# Patient Record
Sex: MALE | Race: WHITE | URBAN - METROPOLITAN AREA
[De-identification: names, ages, dates, MRNs, and addresses within clinical notes are randomized per-mention and may not be internally consistent; named-entity substitution may affect disease eponyms.]

---

## 2021-02-15 ENCOUNTER — EMERGENCY (EMERGENCY)
Facility: HOSPITAL | Age: 51
LOS: 0 days | Discharge: HOME | End: 2021-02-15
Attending: EMERGENCY MEDICINE
Payer: COMMERCIAL

## 2021-02-15 VITALS
HEART RATE: 109 BPM | TEMPERATURE: 100 F | OXYGEN SATURATION: 95 % | SYSTOLIC BLOOD PRESSURE: 138 MMHG | RESPIRATION RATE: 18 BRPM | DIASTOLIC BLOOD PRESSURE: 100 MMHG

## 2021-02-15 VITALS — HEIGHT: 72 IN | WEIGHT: 255.07 LBS

## 2021-02-15 DIAGNOSIS — S61.121A: ICD-10-CM
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
## 2021-02-15 DIAGNOSIS — Z23 ENCOUNTER FOR IMMUNIZATION: ICD-10-CM
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
## 2021-02-15 PROCEDURE — 73140 X-RAY EXAM OF FINGER(S): CPT | Mod: 26,RT

## 2021-02-15 PROCEDURE — 90471 IMMUNIZATION ADMIN: CPT

## 2021-02-15 PROCEDURE — 26765 TREAT FINGER FRACTURE EACH: CPT | Mod: F5

## 2021-02-15 PROCEDURE — 90715 TDAP VACCINE 7 YRS/> IM: CPT

## 2021-02-15 PROCEDURE — 11012 DEB SKIN BONE AT FX SITE: CPT | Mod: F5

## 2021-02-15 PROCEDURE — 99285 EMERGENCY DEPT VISIT HI MDM: CPT | Mod: 25

## 2021-02-15 PROCEDURE — 99283 EMERGENCY DEPT VISIT LOW MDM: CPT

## 2021-02-15 PROCEDURE — 73140 X-RAY EXAM OF FINGER(S): CPT | Mod: RT

## 2021-02-15 PROCEDURE — 14040 TIS TRNFR F/C/C/M/N/A/G/H/F: CPT | Mod: F5

## 2021-02-15 RX ORDER — TETANUS TOXOID, REDUCED DIPHTHERIA TOXOID AND ACELLULAR PERTUSSIS VACCINE, ADSORBED 5; 2.5; 8; 8; 2.5 [IU]/.5ML; [IU]/.5ML; UG/.5ML; UG/.5ML; UG/.5ML
0.5 SUSPENSION INTRAMUSCULAR ONCE
Refills: 0 | Status: COMPLETED | OUTPATIENT
Start: 2021-02-15 | End: 2021-02-15

## 2021-02-15 RX ORDER — CEPHALEXIN 500 MG
1 CAPSULE ORAL
Qty: 14 | Refills: 0
Start: 2021-02-15

## 2021-02-15 RX ORDER — CEPHALEXIN 500 MG
500 CAPSULE ORAL EVERY 12 HOURS
Refills: 0 | Status: DISCONTINUED | OUTPATIENT
Start: 2021-02-15 | End: 2021-02-15

## 2021-02-15 RX ADMIN — TETANUS TOXOID, REDUCED DIPHTHERIA TOXOID AND ACELLULAR PERTUSSIS VACCINE, ADSORBED 0.5 MILLILITER(S): 5; 2.5; 8; 8; 2.5 SUSPENSION INTRAMUSCULAR at 19:03

## 2021-02-15 RX ADMIN — Medication 500 MILLIGRAM(S): at 19:03

## 2021-02-15 NOTE — ED ADULT TRIAGE NOTE - CHIEF COMPLAINT QUOTE
pt a/ox3, reports laceration to right thumb, s/p cutting thumb with a saw. pt applied dressing to site at home. bleeding controlled at this time. pt last tetanus was in 2010.

## 2021-02-15 NOTE — ED STATDOCS - PROGRESS NOTE DETAILS
marcos wet read: meli avalos PA: Right thumb xrays +paco for tuft fracture. Paged Dr. Mcdowell.  ~Oni Smith PA-C PA: Spoke with Dr. Mcdowell, will see patient in ED shortly. ~Oni Smith PA-C Att: marcos wet read: meli avalos PA: Patient is a 52 y/o male with no pertinent PMHx presents to the ED c/o laceration to right thumb. PT states he accidentally cut right thumb with a saw to cut a piece of wood, states saw slipped d/t rain. Pt states he applied dressing to site of laceration PTA. Pt states last Tetanus was in 2010. Denies head strike LOC, blood thinner use. Denies any other injuries or complaints at this time. PA note: LAC repaired by Dr. Mcdowell. Xrays reviewed with with patient. Patient re-examined and re-evaluated. Patient feels much better at this time. ED evaluation, Diagnosis and management discussed with the patient in detail. Workup results discussed with ED attending, OK to dc home on Keflex. Close ORTHO Dr. Mcdowell follow up encouraged.  Strict ED return instructions discussed in detail and patient given the opportunity to ask any questions about their discharge diagnosis and instructions. Patient verbalized understanding. ~ Oni Smith PA-C

## 2021-02-15 NOTE — ED STATDOCS - CARE PLAN
Principal Discharge DX:	Fracture of distal phalanx of right thumb  Secondary Diagnosis:	Skin avulsion

## 2021-02-15 NOTE — ED STATDOCS - PATIENT PORTAL LINK FT
You can access the FollowMyHealth Patient Portal offered by [facility] by registering at the following website: [email or web address]. By joining [facility]’s FollowMyHealth portal, you will also be able to view your health information using other applications (apps) compatible with our system.

## 2021-02-15 NOTE — ED STATDOCS - PHYSICAL EXAMINATION
Constitutional: NAD AAOx3  Eyes: PERRLA EOMI  Head: Normocephalic atraumatic  Mouth: MMM  Cardiac: regular rate   Resp: Lungs CTAB  GI: Abd s/nt/nd, no rebound or guarding.  Neuro: awake, alert, moving all extremities, cranial nerves 2-12 intact, sensation intact, no dysmetria.  Skin: No rashes +right thumb with obvious laceration through distal tip of nail bed, neurovascularly intact, cap refill < 2 seconds. Subungual hematoma. Constitutional: NAD AAOx3  Eyes: PERRLA EOMI  Head: Normocephalic atraumatic  Mouth: MMM  Cardiac: regular rate   Resp: Lungs CTAB  GI: Abd s/nt/nd, no rebound or guarding.  Neuro: awake, alert, moving all extremities, cranial nerves 2-12 intact, sensation intact, no dysmetria.  Skin: No rashes +right thumb with obvious laceration through distal tip of nail bed, neurovascularly intact, cap refill < 2 seconds. Subungual hematoma.    PA NOTE: GEN: AOX3, NAD. HEENT: Throat clear. Airway is patent. EYES: PERRLA. EOMI. Head: NC/AT. NECK: Supple, No JVD. FROM. C-spine non-tender. CV:S1S2, RRR, LUNGS: Non-labored breathing, no tachypnea. O2sat 100% RA. CTA b/l. No w/r/r. CHEST: Equal chest expansion and rise. No deformity. ABD: Soft, NT/ND, no rebound, no guarding. No CVAT. EXT: No e/c/c. 2+ distal pulses. RIGHT THUMB: +Deep skin avulsion noted tip of distal phalanx of right 1st digit with partial amputation of thumbnail. +Mild bleeding. NVI. +Tenderness. No tendon deficits. NEURO: No focal deficits. CN II-XII intact. FROM. 5/5 motor and sensory. ~Oni Smith PA-C Constitutional: NAD AAOx3  Eyes: PERRLA EOMI  Head: Normocephalic atraumatic  Mouth: MMM  Cardiac: regular rate   Resp: Lungs CTAB  GI: Abd s/nt/nd, no rebound or guarding.  Neuro: awake, alert, moving all extremities, cranial nerves 2-12 intact, sensation intact, no dysmetria.  Skin: No rashes +right thumb with obvious laceration through distal tip of nail bed, neurovascularly intact, cap refill < 2 seconds. Subungual hematoma.    PA NOTE: GEN: AOX3, NAD. HEENT: Throat clear. Airway is patent. EYES: PERRLA. EOMI. Head: NC/AT. NECK: Supple, No JVD. FROM. C-spine non-tender. CV:S1S2, RRR, LUNGS: Non-labored breathing, no tachypnea. O2sat 100% RA. CTA b/l. No w/r/r. CHEST: Equal chest expansion and rise. No deformity. ABD: Soft, NT/ND, no rebound, no guarding. No CVAT. EXT: No e/c/c. 2+ distal pulses. RIGHT THUMB: +Deep skin avulsion noted tip of distal phalanx of right 1st digit with partial avulsion of thumbnail. +Mild bleeding. NVI. +Tenderness. No tendon deficits. NEURO: No focal deficits. CN II-XII intact. FROM. 5/5 motor and sensory. ~Oni Smith PA-C

## 2021-02-15 NOTE — ED STATDOCS - ATTENDING CONTRIBUTION TO CARE
I, Renetta Ricks MD, performed the initial face to face bedside interview with this patient regarding history of present illness, review of symptoms and relevant past medical, social and family history.  I completed an independent physical examination.  I was the initial provider who evaluated this patient. I have signed out the follow up of any pending tests (i.e. labs, radiological studies) to the ACP.  I have communicated the patient’s plan of care and disposition with the ACP.  The history, relevant review of systems, past medical and surgical history, medical decision making, and physical examination was documented by the scribe in my presence and I attest to the accuracy of the documentation.

## 2021-02-15 NOTE — ED STATDOCS - NSFOLLOWUPINSTRUCTIONS_ED_ALL_ED_FT
THUMB FRACTURE - General Information           Thumb Fracture    WHAT YOU NEED TO KNOW:    What is a thumb fracture? A thumb fracture is a break in a bone in your thumb.    What are the signs and symptoms of a thumb fracture?   •Pain and swelling      •Little or no thumb movement      •Thumb shape or position that is not normal      How is a thumb fracture diagnosed? Your healthcare provider will ask about the injury and examine your thumb. You may need any of the following:   •An x-ray may be used to check for a fracture.      •A CT scan may show ligament or tissue damage. You may be given contrast liquid before the pictures are taken to help healthcare providers see the damage better. Tell the healthcare provider if you have ever had an allergic reaction to contrast liquid.      How is a thumb fracture treated?   •Prescription pain medicine may be given. Ask your healthcare provider how to take this medicine safely. Some prescription pain medicines contain acetaminophen. Do not take other medicines that contain acetaminophen without talking to your healthcare provider. Too much acetaminophen may cause liver damage. Prescription pain medicine may cause constipation. Ask your healthcare provider how to prevent or treat constipation.       •A cast or splint will help keep your thumb in the correct position as it heals.      •Closed reduction is used to move the bones of your thumb back into their correct positions without surgery. Your healthcare provider will line your bones up by hand.      •Open reduction and internal fixation is surgery to straighten your broken bones. Wires, screws, metal plates, or pins may be used to hold your broken bones together.      How do I manage my symptoms?   •Apply ice on your thumb to help decrease swelling and pain. Ice may also help prevent tissue damage. Use an ice pack, or put crushed ice in a plastic bag. Cover it with a towel before you place it on your thumb, splint, or cast. Apply ice for 15 to 20 minutes every hour, or as directed.      •Elevate your thumb above the level of your heart as often as you can. This will help decrease swelling and pain. Prop your hand on pillows or blankets to keep your thumb elevated comfortably.             •Check the skin around your cast or splint daily for red or sore areas.      •Go to a hand therapist, if recommended. Your healthcare provider may recommend this after your cast or splint is removed. A hand therapist can help you with exercises to strengthen your hand and help restore movement.      When should I seek immediate care?   •Your cast cracks or breaks.      •You are not able to move your fingers.      •You have severe pain in your thumb or hand.      •You have new or increased swelling in your thumb or hand.      •Your cast feels too tight.      •Your injured thumb is numb.      •Your skin under the cast or splint burns or stings.      When should I call my doctor?   •The skin around your cast becomes red and sore.      •The skin under your cast or splint itches, and the itch does not go away.      •You have questions or concerns about your condition or care.      CARE AGREEMENT:    You have the right to help plan your care. Learn about your health condition and how it may be treated. Discuss treatment options with your healthcare providers to decide what care you want to receive. You always have the right to refuse treatment.    Deep Skin Avulsion      A deep skin avulsion is a type of open wound. It often results from a severe injury (trauma) that tears away all layers of the skin or an entire body part. The areas of the body that are most often affected include the face, lips, ears, nose, and fingers.    A deep skin avulsion may make structures below the skin become visible. You may be able to see muscle, bone, nerves, and blood vessels. A deep skin avulsion can also damage important structures beneath the skin. These include bones, tendons, nerves, or blood vessels.      What are the causes?  This condition may be caused by an injury, such as:  •Being crushed.      •Falling against a jagged surface.      •An animal bite.      •A gunshot wound.      •A severe burn.      •Being dragged, such as in a bicycle or motorcycle accident.        What are the signs or symptoms?  Symptoms of this condition include:  •Pain.      •Numbness.      •Swelling.      •Bleeding, which may be heavy.        How is this diagnosed?    This condition may be diagnosed with a medical history and physical exam. You may also have X-rays done.      How is this treated?  Treatment for this condition depends on how large and deep the wound is and where it is located. Treatment usually starts with:  •Controlling the bleeding.      •Washing out the wound with a germ-free (sterile) solution.    After initial treatment, the wound may be closed or left open to heal.  •Wounds that are small and clean may be closed with stitches (sutures).      •Wounds that cannot be closed with sutures may be covered with a piece of skin (graft) or your own skin flap.      •Wounds that are hard to close or that may become infected may be left open. These wounds heal over time from the inside out.    You may also be treated with medicine, such as:  •Antibiotic medicine.      •Pain medicine.      •Tetanus shot.        Follow these instructions at home:    Medicines     •If you were prescribed an antibiotic medicine, take or apply it as told by your health care provider. Do not stop taking or using the antibiotic even if your condition improves.      •Take over-the-counter and prescription medicines only as told by your health care provider.      •If you were prescribed a pain medicine, take it 30 minutes or more before you do any wound care, or as told by your health care provider.      • Do not drive or use heavy machinery while taking prescription pain medicine.        Wound care    •Follow instructions from your health care provider about how to take care of your wound. Make sure you:   •Wash your hands with soap and water before you change your bandage (dressing). If soap and water are not available, use hand .       •Change your dressing as told by your health care provider.       •Leave stitches (sutures), skin glue, or adhesive strips in place. These skin closures may need to stay in place for 2 weeks or longer. If adhesive strip edges start to loosen and curl up, you may trim the loose edges. Do not remove adhesive strips completely unless your health care provider tells you to do that.       •Check your wound every day for signs of infection. Check for:  •Redness, swelling, or pain.       •Fluid or blood.       •Warmth.       •Pus or a bad smell.        •Keep your dressing clean and dry. Do not take baths, swim, use a hot tub, or do anything that would put your wound under water until your health care provider approves.    •Clean the wound each day, or as told by your health care provider:  •Wash the wound with mild soap and water.      •Rinse the wound with water to remove all soap.      •Pat the wound dry with a clean towel. Do not rub it.      •Cover the wound with a clean dressing.        • Do not scratch or pick at the wound.      General instructions     •Raise (elevate) the injured area above the level of your heart while you are sitting or lying down.      • Do not use any products that contain nicotine or tobacco, such as cigarettes and e-cigarettes. These may delay wound healing. If you need help quitting, ask your health care provider.      •Eat a healthy diet that includes foods rich in protein, vitamin A, and vitamin C to help your wound heal.      •Keep all follow-up visits as told by your health care provider. This is important.        Contact a health care provider if:  •You have:  •Pain that does not get better with medicine.      •More redness, swelling, or pain around your wound.      •More fluid or blood coming from your wound.      •A fever.        •You got a tetanus shot and you have swelling, severe pain, redness, or bleeding at the injection site.      •You are nauseous or you vomit.      •You notice something coming out of the wound, such as wood or glass.        Get help right away if:    •You have a red streak going away from your wound.      •A wound that was closed breaks open.      •The wound is bleeding, and the bleeding does not stop with gentle pressure.      •You have trouble breathing.    •The wound is on your hand or foot and:  •You cannot properly move a finger or toe.      •Your fingers or toes look pale or bluish.          Summary    •A deep skin avulsion is a type of injury that can damage important structures beneath the skin.      •A wound may be closed or left open to heal. This depends on the size and location of the wound and whether it is likely to become infected.      •Follow instructions from your health care provider about how to take care of your wound.       •Contact your health care provider if you have increased redness, swelling, or pain around your wound, or your pain is not controlled with medicine.      This information is not intended to replace advice given to you by your health care provider. Make sure you discuss any questions you have with your health care provider.

## 2021-02-15 NOTE — ED STATDOCS - CARE PROVIDER_API CALL
Wale Mcdowell)  Orthopaedic Surgery; Surgery of the Hand  [address] [place] [address]  Phone: [phone]  Fax: [phone]  Follow Up Time: 4-6 Days

## 2021-02-15 NOTE — ED STATDOCS - CLINICAL SUMMARY MEDICAL DECISION MAKING FREE TEXT BOX
52 y/o male presenting with laceration to right thumb. Will update Tetanus and provide wound care. Pt declining pain medications at this time. 50 y/o male presenting with laceration to right thumb. Will update Tetanus and provide wound care. Pt declining pain medications at this time.    PA note: LAC repaired by Dr. Mcdowell. Xrays reviewed with with patient. Patient re-examined and re-evaluated. Patient feels much better at this time. ED evaluation, Diagnosis and management discussed with the patient in detail. Workup results discussed with ED attending, OK to dc home on Keflex. Close ORTHO Dr. Mcdowell follow up encouraged.  Strict ED return instructions discussed in detail and patient given the opportunity to ask any questions about their discharge diagnosis and instructions. Patient verbalized understanding. ~ Oni Smith PA-C

## 2021-02-15 NOTE — ED STATDOCS - OBJECTIVE STATEMENT
50 y/o male with no pertinent PMHx presents to the ED c/o laceration to right thumb. PT states he accidentally cut right thumb with a saw to cut a piece of wood, states saw slipped d/t rain. Pt states he applied dressing to site of laceration PTA. Pt states last Tetanus was in 2010. Denies head strike LOC, blood thinner use. Denies any other injuries or complaints at this time.

## 2025-05-18 NOTE — ED STATDOCS - NS ED ROS FT
Constitutional: No fever or chills  Eyes: No visual changes  HEENT: No throat pain  CV: No chest pain  Resp: No SOB no cough  GI: No abd pain, nausea or vomiting  : No dysuria  MSK: No musculoskeletal pain  Skin: No rash, + r thumb laceration  Neuro: No headache
No